# Patient Record
Sex: MALE | Race: BLACK OR AFRICAN AMERICAN
[De-identification: names, ages, dates, MRNs, and addresses within clinical notes are randomized per-mention and may not be internally consistent; named-entity substitution may affect disease eponyms.]

---

## 2020-02-29 ENCOUNTER — HOSPITAL ENCOUNTER (EMERGENCY)
Dept: HOSPITAL 56 - MW.ED | Age: 35
Discharge: HOME | End: 2020-02-29
Payer: COMMERCIAL

## 2020-02-29 DIAGNOSIS — M67.432: Primary | ICD-10-CM

## 2020-02-29 PROCEDURE — 99283 EMERGENCY DEPT VISIT LOW MDM: CPT

## 2020-02-29 PROCEDURE — 96372 THER/PROPH/DIAG INJ SC/IM: CPT

## 2020-02-29 NOTE — EDM.PDOC
ED HPI GENERAL MEDICAL PROBLEM





- General


Chief Complaint: Upper Extremity Injury/Pain


Stated Complaint: PROBLEM WITH WRIST


Time Seen by Provider: 02/29/20 11:03


Source of Information: Reports: Patient


History Limitations: Reports: No Limitations





- History of Present Illness


INITIAL COMMENTS - FREE TEXT/NARRATIVE: 


HISTORY AND PHYSICAL:





History of present illness:


Patient is a 34-year-old male who presents to the emergency room with 

complaints of a "lump" to his left wrist.  He states he is noticed this area 

for approximately 4 weeks and does notice some fluctuant in size.  He denies 

any numbness or tingling of the affected extremity.  Denies any injury or 

trauma.  He states that the area is painful to palpation and does bother him 

when he is having to use his hands or move his wrist frequently.  Offers no 

systemic complaints.





Review of systems: 


As per history of present illness and below otherwise all systems reviewed and 

negative.





Past medical history: 


As per history of present illness and as reviewed below otherwise 

noncontributory.





Surgical history: 


As per history of present illness and as reviewed below otherwise 

noncontributory.





Social history: 


See social history for further information





Family history: 


As per history of present illness and as reviewed below otherwise 

noncontributory.





Physical exam:


General: Well-developed and well-nourished 34-year-old male.  Alert and 

oriented.  Nontoxic-appearing and in no acute distress.


HEENT: Atraumatic, normocephalic, pupils equal and reactive bilaterally, 

negative for conjunctival pallor or scleral icterus, mucous membranes moist, 

trachea midline. No drooling or trismus noted. No meningeal signs. No hot 

potato voice noted. 


Lungs: Clear to auscultation, breath sounds equal bilaterally.


Heart: S1S2, regular rate and rhythm without overt murmur


Abdomen: Soft, nondistended, nontender. Negative for masses or costovertebral 

tenderness.


Skin: SEE EXTREMITY. Otherwise skin is intact, warm, dry. No lesions or rashes 

noted.


Extremities: Atraumatic, moves all extremities per self without difficulty or 

deficits, dime size ganglion cyst noted to the volar aspect of the left wrist. 

Neurovascular unremarkable.


Neuro: Awake, alert, oriented. Cranial nerves II through XII unremarkable. 

Cerebellum unremarkable. Motor and sensory unremarkable throughout. Exam 

nonfocal.





Notes:


Patient request something for pain.  We discussed the need for follow-up with 

hand or general surgery to have the ganglion cyst removed if it becomes too 

bothersome.  Supportive care measures were reviewed and discussed. Voices 

understanding and is agreeable to plan of care. Denies any further questions or 

concerns at this time.





Diagnostics:


None





Therapeutics:


Toradol





Prescription:


Diclofenac





Impression: 


Volar ganglion cyst





Plan:


1. You can have the cyst removed in a controlled setting with a general 

surgeon. This cyst is too close to the radial artery to do in the ER or clinic. 

Activity modifications may help with discomfort. Compressive wraps or wrist 

supports worn during activities that exacerbate discomfort can also help.


2. Use the Diclofenac as directed and as needed


3. Return to the ED as needed and as discussed. 





Definitive disposition and diagnosis as appropriate pending reevaluation and 

review of above.





  ** left wrist


Pain Score (Numeric/FACES): 7





- Related Data


 Allergies











Allergy/AdvReac Type Severity Reaction Status Date / Time


 


No Known Allergies Allergy   Verified 02/29/20 11:01











Home Meds: 


 Home Meds





Diclofenac Sodium [Voltaren] 50 mg PO TID PRN #30 tab.ec 02/29/20 [Rx]











Past Medical History





- Past Health History


Medical/Surgical History: Denies Medical/Surgical History





Social & Family History





- Family History


Family Medical History: Noncontributory





- Tobacco Use


Smoking Status *Q: Never Smoker





- Recreational Drug Use


Recreational Drug Use: No





Review of Systems





- Review of Systems


Review Of Systems: Comprehensive ROS is negative, except as noted in HPI.





ED EXAM, GENERAL





- Physical Exam


Exam: See Below (See dictation)





Course





- Vital Signs


Last Recorded V/S: 


 Last Vital Signs











Temp  97.4 F   02/29/20 10:58


 


Pulse  90   02/29/20 10:58


 


Resp  16   02/29/20 10:58


 


BP  145/100 H  02/29/20 10:58


 


Pulse Ox  96   02/29/20 10:58














- Orders/Labs/Meds


Meds: 


Medications














Discontinued Medications














Generic Name Dose Route Start Last Admin





  Trade Name Freq  PRN Reason Stop Dose Admin


 


Ketorolac Tromethamine  60 mg  02/29/20 11:06  02/29/20 11:19





  Toradol  IM  02/29/20 11:07  60 mg





  ONETIME ONE   Administration





     





     





     





     














Departure





- Departure


Time of Disposition: 11:12


Disposition: Home, Self-Care 01


Clinical Impression: 


 Ganglion cyst








- Discharge Information


Prescriptions: 


Diclofenac Sodium [Voltaren] 50 mg PO TID PRN #30 tab.ec


 PRN Reason: Pain


Instructions:  Ganglion Cyst


Referrals: 


PCP,None [Primary Care Provider] - 


Forms:  ED Department Discharge


Additional Instructions: 


The following information is given to patients seen in the emergency department 

who are being discharged to home. This information is to outline your options 

for follow-up care. We provide all patients seen in our emergency department 

with a follow-up referral.





The need for follow-up, as well as the timing and circumstances, are variable 

depending upon the specifics of your emergency department visit.





If you don't have a primary care physician on staff, we will provide you with a 

referral. We always advise you to contact your personal physician following an 

emergency department visit to inform them of the circumstance of the visit and 

for follow-up with them and/or the need for any referrals to a consulting 

specialist.





The emergency department will also refer you to a specialist when appropriate. 

This referral assures that you have the opportunity for follow-up care with a 

specialist. All of these measure are taken in an effort to provide you with 

optimal care, which includes your follow-up.





Under all circumstances we always encourage you to contact your private 

physician who remains a resource for coordinating your care. When calling for 

follow-up care, please make the office aware that this follow-up is from your 

recent emergency room visit. If for any reason you are refused follow-up, 

please contact the [facility] Emergency 

Department at [phone] and asked to speak to the emergency department 

charge nurse.





[facility]


Primary Care


[address] [place] [id number]


Phone: [phone]


Fax: [phone]





[facility]


[address] [place] [id number]


Phone: [phone]


Fax: [phone]





1. You can have the cyst removed in a controlled setting with a general 

surgeon. This cyst is too close to the radial artery to do in the ER or clinic. 

Activity modifications may help with discomfort. Compressive wraps or wrist 

supports worn during activities that exacerbate discomfort can also help.


2. Use the Diclofenac as directed and as needed


3. Return to the ED as needed and as discussed. 





Sepsis Event Note





- Evaluation


Sepsis Screening Result: No Definite Risk





- Focused Exam


Vital Signs: 


 Vital Signs











  Temp Pulse Resp BP Pulse Ox


 


 02/29/20 10:58  97.4 F  90  16  145/100 H  96











Date Exam was Performed: 02/29/20


Time Exam was Performed: 11:46

## 2022-12-01 ENCOUNTER — HOSPITAL ENCOUNTER (EMERGENCY)
Dept: HOSPITAL 56 - MW.ED | Age: 37
Discharge: HOME | End: 2022-12-01
Payer: COMMERCIAL

## 2022-12-01 DIAGNOSIS — J02.9: Primary | ICD-10-CM

## 2022-12-01 DIAGNOSIS — M54.50: ICD-10-CM

## 2022-12-01 PROCEDURE — 96372 THER/PROPH/DIAG INJ SC/IM: CPT

## 2022-12-01 PROCEDURE — 99283 EMERGENCY DEPT VISIT LOW MDM: CPT

## 2022-12-01 PROCEDURE — 71045 X-RAY EXAM CHEST 1 VIEW: CPT

## 2022-12-01 PROCEDURE — 87651 STREP A DNA AMP PROBE: CPT
